# Patient Record
Sex: MALE | Employment: PART TIME | URBAN - METROPOLITAN AREA
[De-identification: names, ages, dates, MRNs, and addresses within clinical notes are randomized per-mention and may not be internally consistent; named-entity substitution may affect disease eponyms.]

---

## 2019-01-17 ENCOUNTER — OFFICE VISIT (OUTPATIENT)
Dept: PEDIATRIC GASTROENTEROLOGY | Age: 19
End: 2019-01-17

## 2019-01-17 ENCOUNTER — HOSPITAL ENCOUNTER (OUTPATIENT)
Dept: GENERAL RADIOLOGY | Age: 19
Discharge: HOME | End: 2019-01-17
Payer: COMMERCIAL

## 2019-01-17 VITALS
HEIGHT: 71 IN | OXYGEN SATURATION: 98 % | BODY MASS INDEX: 17.5 KG/M2 | TEMPERATURE: 98.3 F | DIASTOLIC BLOOD PRESSURE: 92 MMHG | HEART RATE: 69 BPM | SYSTOLIC BLOOD PRESSURE: 142 MMHG | WEIGHT: 125 LBS | RESPIRATION RATE: 18 BRPM

## 2019-01-17 DIAGNOSIS — R10.84 GENERALIZED ABDOMINAL PAIN: Primary | ICD-10-CM

## 2019-01-17 DIAGNOSIS — R11.2 NON-INTRACTABLE VOMITING WITH NAUSEA, UNSPECIFIED VOMITING TYPE: ICD-10-CM

## 2019-01-17 DIAGNOSIS — R62.7 POOR WEIGHT GAIN IN ADULT: ICD-10-CM

## 2019-01-17 DIAGNOSIS — R10.84 GENERALIZED ABDOMINAL PAIN: ICD-10-CM

## 2019-01-17 PROCEDURE — 74018 RADEX ABDOMEN 1 VIEW: CPT

## 2019-01-17 RX ORDER — PANTOPRAZOLE SODIUM 20 MG/1
20 TABLET, DELAYED RELEASE ORAL DAILY
Qty: 30 TAB | Refills: 2 | Status: SHIPPED | OUTPATIENT
Start: 2019-01-17 | End: 2021-09-01 | Stop reason: SDUPTHER

## 2019-01-17 NOTE — H&P (VIEW-ONLY)
1/17/2019 Hipolito Gracia. 
2000 CC: Abdominal Pain History of present illness Hipolito Tierney was seen today as a new patient for abdominal pain. The pain started 4 years ago. There was no preceding illness or trauma. The pain has been localized to the generalized region. The pain is described as being aching, cramping and colicky and lasting 4 hours without radiation. The pain is occurring every 1 day. Worse post meals. With associated nausea and NBNB emesis. There are no reports of some pharyngeal esophageal dysphagia. Stool are reported to be loose/hard occurring every 1 day, without blood or edith-anal pain. There are no reports of abnormal urination. There are no reports of chronic fevers. There are no reports of rashes or joint pain. He was in [place] and underwent a colonoscopy. One small polyp was noted. He was labeled with having irritable bowel syndrome in [place]. He struggled to gain weight and currently weighs 125 pounds. He is smoking marijuana regularly to self medicate for the nausea. This does not seem to be helping. He is also not achieving improvement with acid suppression medication Allergies: No known drug allergies No current outpatient medications on file prior to visit. No current facility-administered medications on file prior to visit. Family History Problem Relation Age of Onset  Stroke Mother   
     cva  Depression Mother  GERD Mother  Other Mother   
     gastro peresis, hypothyroidism, benign uterian cysts and polyps  Gall Bladder Disease Mother   
     removed  Other Father   
     necrotic hip tissue, two hip replacements Past Surgical History:  
Procedure Laterality Date  HX OTHER SURGICAL Fixed Circumcision  HX RHINOPLASTY  HX TONSIL AND ADENOIDECTOMY Immunizations are up to date by report. Review of Systems General: No fevers, poor weight gain Hematologic: denies bruising, excessive bleeding Head/Neck: denies vision changes, sore throat, runny nose, nose bleeds, or hearing changes Respiratory: denies cough, shortness of breath, wheezing, stridor, or cough Cardiovascular: denies chest pain, hypertension, palpitations, syncope, dyspnea on exertion Gastrointestinal: Positive nausea positive pain Genitourinary: denies dysuria, frequency, urgency, or enuresis or daytime wetting Musculoskeletal: denies pain, swelling, redness of muscles or joints Neurologic: denies convulsions, paralyses, or tremor Dermatologic: denies rash, itching, or dryness Psychiatric/Behavior: denies emotional problems, anxiety, depression, or previous psychiatric care Lymphatic: denies local or general lymph node enlargement or tenderness Endocrine: denies polydipsia, polyuria, intolerance to heat or cold, or abnormal sexual development. Physical Exam 
 height is 5' 10.63\" (1.794 m) and weight is 125 lb (56.7 kg). His oral temperature is 98.3 °F (36.8 °C). His blood pressure is 145/91 (abnormal) and his pulse is 69. His respiration is 18 and oxygen saturation is 98%. Body mass index is 17.62 kg/m². General: He is awake, alert, and in no distress, and appears to be a bit thin. He is hydrated HEENT: The sclera appear anicteric, the conjunctiva pink, the oral mucosa appears without lesions, and the dentition is fair. Chest: Clear breath sounds CV: Regular rate and rhythm Abdomen: soft, mild periumbilical tenderness without guarding, non-distended, without masses. There is no hepatosplenomegaly, bowel sounds active Extremities: well perfused with no joint abnormalities Skin: no rash, no jaundice Neuro: moves all 4 well, normal gait Lymph: no significant lymphadenopathy Impression Impression Raul Dacosta is 19 y.o.  with abdominal pain, nausea, poor weight gain.   He underwent a colonoscopy in [place] with polyp which is likely noncontributory. Is been labeled with irritable bowel syndrome given his persistence of nausea and reports of some dysphasia as well it seems like he needs additional evaluation, as he has current red flag signs and symptoms Plan/Recommendation Stop smoking marijuana, may be causing hyperemesis syndrome Protonix 20 mg daily KUB today Labs: CBC, CMP, Lipase, celiac panel EGD pending KUB and lab results Following up with primary care provider on thyroid issue, these tests were run last month All patient and caregiver questions and concerns were addressed during the visit. Major risks, benefits, and side-effects of therapy were discussed.

## 2019-01-17 NOTE — PROGRESS NOTES
Chief Complaint Patient presents with  Feeding Concern Can not keep food down  Abdominal Pain Patient complains of abdominal upset after eating. Patient had coloscopy last year with hx polyps that was benign. C/O pain in left side of abdomen, rates pain 4/10 on pain scale. Initial blood pressure was high at 145/91, Manual bp checked at end of office visit, 142/92. Dr. Gema Martin informed. Per Dr. Gema Martin patient advised to f/u with primary care.

## 2019-01-17 NOTE — PROGRESS NOTES
1/17/2019 Himanshu Montana 
2000 CC: Abdominal Pain History of present illness Himanshu Montana was seen today as a new patient for abdominal pain. The pain started 4 years ago. There was no preceding illness or trauma. The pain has been localized to the generalized region. The pain is described as being aching, cramping and colicky and lasting 4 hours without radiation. The pain is occurring every 1 day. Worse post meals. With associated nausea and NBNB emesis. There are no reports of some pharyngeal esophageal dysphagia. Stool are reported to be loose/hard occurring every 1 day, without blood or edith-anal pain. There are no reports of abnormal urination. There are no reports of chronic fevers. There are no reports of rashes or joint pain. He was in [place] and underwent a colonoscopy. One small polyp was noted. He was labeled with having irritable bowel syndrome in [place]. He struggled to gain weight and currently weighs 125 pounds. He is smoking marijuana regularly to self medicate for the nausea. This does not seem to be helping. He is also not achieving improvement with acid suppression medication Allergies: No known drug allergies No current outpatient medications on file prior to visit. No current facility-administered medications on file prior to visit. Family History Problem Relation Age of Onset  Stroke Mother   
     cva  Depression Mother  GERD Mother  Other Mother   
     gastro peresis, hypothyroidism, benign uterian cysts and polyps  Gall Bladder Disease Mother   
     removed  Other Father   
     necrotic hip tissue, two hip replacements Past Surgical History:  
Procedure Laterality Date  HX OTHER SURGICAL Fixed Circumcision  HX RHINOPLASTY  HX TONSIL AND ADENOIDECTOMY Immunizations are up to date by report. Review of Systems General: No fevers, poor weight gain Hematologic: denies bruising, excessive bleeding Head/Neck: denies vision changes, sore throat, runny nose, nose bleeds, or hearing changes Respiratory: denies cough, shortness of breath, wheezing, stridor, or cough Cardiovascular: denies chest pain, hypertension, palpitations, syncope, dyspnea on exertion Gastrointestinal: Positive nausea positive pain Genitourinary: denies dysuria, frequency, urgency, or enuresis or daytime wetting Musculoskeletal: denies pain, swelling, redness of muscles or joints Neurologic: denies convulsions, paralyses, or tremor Dermatologic: denies rash, itching, or dryness Psychiatric/Behavior: denies emotional problems, anxiety, depression, or previous psychiatric care Lymphatic: denies local or general lymph node enlargement or tenderness Endocrine: denies polydipsia, polyuria, intolerance to heat or cold, or abnormal sexual development. Physical Exam 
 height is 5' 10.63\" (1.794 m) and weight is 125 lb (56.7 kg). His oral temperature is 98.3 °F (36.8 °C). His blood pressure is 145/91 (abnormal) and his pulse is 69. His respiration is 18 and oxygen saturation is 98%. Body mass index is 17.62 kg/m². General: He is awake, alert, and in no distress, and appears to be a bit thin. He is hydrated HEENT: The sclera appear anicteric, the conjunctiva pink, the oral mucosa appears without lesions, and the dentition is fair. Chest: Clear breath sounds CV: Regular rate and rhythm Abdomen: soft, mild periumbilical tenderness without guarding, non-distended, without masses. There is no hepatosplenomegaly, bowel sounds active Extremities: well perfused with no joint abnormalities Skin: no rash, no jaundice Neuro: moves all 4 well, normal gait Lymph: no significant lymphadenopathy Impression Impression Jasmine Hays is 19 y.o.  with abdominal pain, nausea, poor weight gain.   He underwent a colonoscopy in [place] with polyp which is likely noncontributory. Is been labeled with irritable bowel syndrome given his persistence of nausea and reports of some dysphasia as well it seems like he needs additional evaluation, as he has current red flag signs and symptoms Plan/Recommendation Stop smoking marijuana, may be causing hyperemesis syndrome Protonix 20 mg daily KUB today Labs: CBC, CMP, Lipase, celiac panel EGD pending KUB and lab results Following up with primary care provider on thyroid issue, these tests were run last month All patient and caregiver questions and concerns were addressed during the visit. Major risks, benefits, and side-effects of therapy were discussed.

## 2019-01-17 NOTE — PATIENT INSTRUCTIONS
Stop mariajuana use - could be causing hyperemesis Labs today KUB today EGD in a few weeks 
 
protonix 20 mg daily

## 2019-01-18 LAB
ALBUMIN SERPL-MCNC: 5.1 G/DL (ref 3.5–5.5)
ALBUMIN/GLOB SERPL: 2.2 {RATIO} (ref 1.2–2.2)
ALP SERPL-CCNC: 67 IU/L (ref 39–117)
ALT SERPL-CCNC: 17 IU/L (ref 0–44)
AST SERPL-CCNC: 18 IU/L (ref 0–40)
BASOPHILS # BLD AUTO: 0.1 X10E3/UL (ref 0–0.2)
BASOPHILS NFR BLD AUTO: 1 %
BILIRUB SERPL-MCNC: 0.7 MG/DL (ref 0–1.2)
BUN SERPL-MCNC: 8 MG/DL (ref 6–20)
BUN/CREAT SERPL: 11 (ref 9–20)
CALCIUM SERPL-MCNC: 10.1 MG/DL (ref 8.7–10.2)
CHLORIDE SERPL-SCNC: 105 MMOL/L (ref 96–106)
CO2 SERPL-SCNC: 22 MMOL/L (ref 20–29)
CREAT SERPL-MCNC: 0.72 MG/DL (ref 0.76–1.27)
EOSINOPHIL # BLD AUTO: 0 X10E3/UL (ref 0–0.4)
EOSINOPHIL NFR BLD AUTO: 1 %
ERYTHROCYTE [DISTWIDTH] IN BLOOD BY AUTOMATED COUNT: 13.5 % (ref 12.3–15.4)
GLIADIN PEPTIDE IGA SER-ACNC: 4 UNITS (ref 0–19)
GLIADIN PEPTIDE IGG SER-ACNC: 3 UNITS (ref 0–19)
GLOBULIN SER CALC-MCNC: 2.3 G/DL (ref 1.5–4.5)
GLUCOSE SERPL-MCNC: 91 MG/DL (ref 65–99)
HCT VFR BLD AUTO: 43.1 % (ref 37.5–51)
HGB BLD-MCNC: 14.1 G/DL (ref 13–17.7)
IGA SERPL-MCNC: 143 MG/DL (ref 90–386)
IMM GRANULOCYTES # BLD AUTO: 0 X10E3/UL (ref 0–0.1)
IMM GRANULOCYTES NFR BLD AUTO: 0 %
LIPASE SERPL-CCNC: 23 U/L (ref 13–78)
LYMPHOCYTES # BLD AUTO: 2.1 X10E3/UL (ref 0.7–3.1)
LYMPHOCYTES NFR BLD AUTO: 35 %
MCH RBC QN AUTO: 28.1 PG (ref 26.6–33)
MCHC RBC AUTO-ENTMCNC: 32.7 G/DL (ref 31.5–35.7)
MCV RBC AUTO: 86 FL (ref 79–97)
MONOCYTES # BLD AUTO: 0.5 X10E3/UL (ref 0.1–0.9)
MONOCYTES NFR BLD AUTO: 8 %
NEUTROPHILS # BLD AUTO: 3.3 X10E3/UL (ref 1.4–7)
NEUTROPHILS NFR BLD AUTO: 55 %
PLATELET # BLD AUTO: 206 X10E3/UL (ref 150–379)
POTASSIUM SERPL-SCNC: 4.1 MMOL/L (ref 3.5–5.2)
PROT SERPL-MCNC: 7.4 G/DL (ref 6–8.5)
RBC # BLD AUTO: 5.02 X10E6/UL (ref 4.14–5.8)
SODIUM SERPL-SCNC: 145 MMOL/L (ref 134–144)
TTG IGA SER-ACNC: <2 U/ML (ref 0–3)
TTG IGG SER-ACNC: <2 U/ML (ref 0–5)
WBC # BLD AUTO: 6 X10E3/UL (ref 3.4–10.8)

## 2019-01-21 NOTE — PROGRESS NOTES
Called Baldemar Oliver and informed him of results and plan. He verbalized understanding and had no further questions.   
 
Pts cell:[phone]

## 2019-02-01 ENCOUNTER — ANESTHESIA (OUTPATIENT)
Dept: ENDOSCOPY | Age: 19
End: 2019-02-01
Payer: COMMERCIAL

## 2019-02-01 ENCOUNTER — ANESTHESIA EVENT (OUTPATIENT)
Dept: ENDOSCOPY | Age: 19
End: 2019-02-01
Payer: COMMERCIAL

## 2019-02-01 ENCOUNTER — HOSPITAL ENCOUNTER (OUTPATIENT)
Age: 19
Setting detail: OUTPATIENT SURGERY
Discharge: HOME | End: 2019-02-01
Attending: PEDIATRICS | Admitting: PEDIATRICS
Payer: COMMERCIAL

## 2019-02-01 VITALS
HEART RATE: 57 BPM | OXYGEN SATURATION: 100 % | TEMPERATURE: 97.9 F | BODY MASS INDEX: 18.61 KG/M2 | WEIGHT: 130 LBS | SYSTOLIC BLOOD PRESSURE: 128 MMHG | HEIGHT: 70 IN | RESPIRATION RATE: 18 BRPM | DIASTOLIC BLOOD PRESSURE: 76 MMHG

## 2019-02-01 DIAGNOSIS — R10.13 EPIGASTRIC PAIN: ICD-10-CM

## 2019-02-01 PROCEDURE — 88305 TISSUE EXAM BY PATHOLOGIST: CPT

## 2019-02-01 PROCEDURE — 77030009426 HC FCPS BIOP ENDOSC BSC -B: Performed by: PEDIATRICS

## 2019-02-01 PROCEDURE — 76060000031 HC ANESTHESIA FIRST 0.5 HR: Performed by: PEDIATRICS

## 2019-02-01 PROCEDURE — 76040000019: Performed by: PEDIATRICS

## 2019-02-01 PROCEDURE — 74011250636 HC RX REV CODE- 250/636

## 2019-02-01 RX ORDER — PROPOFOL 10 MG/ML
INJECTION, EMULSION INTRAVENOUS AS NEEDED
Status: DISCONTINUED | OUTPATIENT
Start: 2019-02-01 | End: 2019-02-01 | Stop reason: HOSPADM

## 2019-02-01 RX ORDER — LIDOCAINE HYDROCHLORIDE 20 MG/ML
INJECTION, SOLUTION EPIDURAL; INFILTRATION; INTRACAUDAL; PERINEURAL AS NEEDED
Status: DISCONTINUED | OUTPATIENT
Start: 2019-02-01 | End: 2019-02-01 | Stop reason: HOSPADM

## 2019-02-01 RX ORDER — SODIUM CHLORIDE 9 MG/ML
INJECTION, SOLUTION INTRAVENOUS
Status: DISCONTINUED | OUTPATIENT
Start: 2019-02-01 | End: 2019-02-01 | Stop reason: HOSPADM

## 2019-02-01 RX ADMIN — PROPOFOL 100 MG: 10 INJECTION, EMULSION INTRAVENOUS at 15:40

## 2019-02-01 RX ADMIN — PROPOFOL 100 MG: 10 INJECTION, EMULSION INTRAVENOUS at 15:41

## 2019-02-01 RX ADMIN — LIDOCAINE HYDROCHLORIDE 100 MG: 20 INJECTION, SOLUTION EPIDURAL; INFILTRATION; INTRACAUDAL; PERINEURAL at 15:39

## 2019-02-01 RX ADMIN — PROPOFOL 100 MG: 10 INJECTION, EMULSION INTRAVENOUS at 15:39

## 2019-02-01 RX ADMIN — SODIUM CHLORIDE: 9 INJECTION, SOLUTION INTRAVENOUS at 15:38

## 2019-02-01 NOTE — INTERVAL H&P NOTE
H&P Update: 
Malathi Spangler was seen and examined. History and physical has been reviewed. The patient has been examined. There have been no significant clinical changes since the completion of the originally dated History and Physical. 
Patient identified by surgeon; surgical site was confirmed by patient and surgeon.  
 
Signed By: Flakita Lal MD   
 February 1, 2019 2:22 PM

## 2019-02-01 NOTE — DISCHARGE INSTRUCTIONS
118 [facility].  217 [facility]  [address]  [id number]  2000    EGD DISCHARGE INSTRUCTIONS  Discomfort:  Sore throat- throat lozenges or warm salt water gargle  redness at IV site- apply warm compress to area; if redness or soreness persist- contact your physician  Gaseous discomfort- walking, belching will help relieve any discomfort  You may not operate a vehicle for 12 hours    DIET Regular diet. MEDICATIONS:  Resume home medications    ACTIVITY   Spend the remainder of the day resting -  avoid any strenuous activity. May resume normal activities tomorrow. CALL M.D. ANY SIGN of:  Increasing pain, nausea, vomiting  Abdominal distension (swelling)  Fever or chills  Pain in chest area      Follow-up Instructions:  Call [facility] for any questions or problems.  Telephone # [phone]

## 2019-02-01 NOTE — ROUTINE PROCESS
Malathi Goldy. 
2000 
[id number] Situation: 
Verbal report received from: Janell Nolan 272 Procedure: Procedure(s): ESOPHAGOGASTRODUODENOSCOPY (EGD) ESOPHAGOGASTRODUODENAL (EGD) BIOPSY Background: 
 
Preoperative diagnosis: ABDOMINAL PAIN Postoperative diagnosis: Abdominal Pain :  Dr. Jeff Garcia Assistant(s): Endoscopy RN-1: Rose Marie Fan RN Endoscopy RN-2: Mare Mathew RN Specimens:  
ID Type Source Tests Collected by Time Destination 1 : Duodenum bx Preservative   Mikael Matias MD 2/1/2019 1434 Pathology 2 : Stomach bx Preservative   Mikael Matias MD 2/1/2019 1435 Pathology 3 : Distal Esophagus bx Preservative   Mikael Matias MD 2/1/2019 1436 Pathology 4 : Mid Esophagus bx Ralph Matias MD 2/1/2019 1436 Pathology H. Pylori  no Assessment: 
Intra-procedure medications Anesthesia gave intra-procedure sedation and medications, see anesthesia flow sheet yes Intravenous fluids: NS@ Esta Shyam Vital signs stable Abdominal assessment: round and soft Recommendation: 
Discharge patient per MD order. Family or Friend Andreas and his mom Permission to share finding with family or friend yes

## 2019-02-01 NOTE — ANESTHESIA POSTPROCEDURE EVALUATION
Procedure(s): ESOPHAGOGASTRODUODENOSCOPY (EGD) ESOPHAGOGASTRODUODENAL (EGD) BIOPSY. Anesthesia Post Evaluation Patient location during evaluation: PACU Patient participation: complete - patient participated Level of consciousness: awake and alert Pain management: adequate Airway patency: patent Anesthetic complications: no 
Cardiovascular status: acceptable Respiratory status: acceptable Hydration status: acceptable Comments: I have seen and evaluated the patient and is ready for discharge. Layla Holden MD 
 
Post anesthesia nausea and vomiting:  none Visit Vitals /56 Pulse 76 Temp 36.9 °C (98.5 °F) Resp 20 Ht 5' 10\" (1.778 m) Wt 59 kg (130 lb) SpO2 95% BMI 18.65 kg/m²

## 2019-02-01 NOTE — OP NOTES
118 [facility]  [address], [address]   [phone]      Endoscopic Esophagogastroduodenoscopy Procedure Note    Karel Alegria  2000  [id number]    Procedure: Endoscopic Gastroduodenoscopy with biopsy    Pre-operative Diagnosis: epigastric pain    Post-operative Diagnosis: normal EGD grossly    : John Christian MD    Referring Provider:  Phong Moyer MD    Anesthesia/Sedation: Sedation provided by the Anesthesia team.     Pre-Procedural Exam:  Heart: RRR, without gallops or rubs  Lungs: clear bilaterally without wheezes, crackles, or rhonchi  Abdomen: soft, nontender, nondistended, bowel sounds present  Mental Status: awake, alert      Procedure Details   After satisfactory titration of sedation, an endoscope was inserted through the oropharynx into the upper esophagus. The endoscope was then passed through the lower esophagus and then the GE junction, and then into the stomach to the level of the pylorus and then retroflexed and the gastroesophageal junction was inspected. Endoscope was advanced through the pylorus into the second to third portion of the duodenum and then retracted back into the gastric lumen. The stomach was decompressed and the endoscope was retracted into the distal esophagus. The endoscope was retracted to the mid and upper esophagus. The stomach was decompressed and the endoscope was retracted fully. Findings:   Esophagus:normal  Stomach:normal   Duodenum/jejunum:normal    Therapies:  none    Specimens:   · Antrum - 2  · Duodenum - 2  · Duodenal bulb - 2  · Distal esophagus - 2  · Mid esophagus - 2           Estimated Blood Loss:  minimal    Complications:   None; patient tolerated the procedure well. Impression:    -Normal upper endoscopy, with no endoscopic evidence or mucosal abnormality. No ulcers  Recommendations:  -Acid suppression with a proton pump inhibitor. , -Await pathology. , -Follow up with me.     Bihn Agarwal Nas Zacarias MD

## 2019-02-01 NOTE — PROGRESS NOTES

## 2019-02-01 NOTE — ANESTHESIA PREPROCEDURE EVALUATION
Anesthetic History No history of anesthetic complications Review of Systems / Medical History Patient summary reviewed, nursing notes reviewed and pertinent labs reviewed Pulmonary Within defined limits Neuro/Psych Within defined limits Cardiovascular Within defined limits GI/Hepatic/Renal 
Within defined limits Endo/Other Within defined limits Other Findings Physical Exam 
 
Airway Mallampati: II 
TM Distance: > 6 cm Neck ROM: normal range of motion Mouth opening: Normal 
 
 Cardiovascular Regular rate and rhythm,  S1 and S2 normal,  no murmur, click, rub, or gallop Dental 
No notable dental hx Pulmonary Breath sounds clear to auscultation Abdominal 
GI exam deferred Other Findings Anesthetic Plan ASA: 1 Anesthesia type: MAC Anesthetic plan and risks discussed with: Patient

## 2021-09-01 ENCOUNTER — OFFICE VISIT (OUTPATIENT)
Dept: FAMILY MEDICINE CLINIC | Age: 21
End: 2021-09-01
Payer: COMMERCIAL

## 2021-09-01 VITALS
SYSTOLIC BLOOD PRESSURE: 138 MMHG | BODY MASS INDEX: 17.44 KG/M2 | HEART RATE: 91 BPM | HEIGHT: 72 IN | WEIGHT: 128.8 LBS | DIASTOLIC BLOOD PRESSURE: 91 MMHG | TEMPERATURE: 97.6 F | OXYGEN SATURATION: 97 % | RESPIRATION RATE: 18 BRPM

## 2021-09-01 DIAGNOSIS — Z76.89 ENCOUNTER TO ESTABLISH CARE: ICD-10-CM

## 2021-09-01 DIAGNOSIS — K58.0 IRRITABLE BOWEL SYNDROME WITH DIARRHEA: Primary | ICD-10-CM

## 2021-09-01 PROCEDURE — 99204 OFFICE O/P NEW MOD 45 MIN: CPT | Performed by: NURSE PRACTITIONER

## 2021-09-01 RX ORDER — PANTOPRAZOLE SODIUM 20 MG/1
20 TABLET, DELAYED RELEASE ORAL DAILY
Qty: 30 TABLET | Refills: 2 | Status: SHIPPED | OUTPATIENT
Start: 2021-09-01

## 2021-09-01 NOTE — PATIENT INSTRUCTIONS
Please take medication as prescribed  Follow up with GI MD for continued stomach issues  Return to have labs checked, will follow up with results when they return. Irritable Bowel Syndrome: Care Instructions  Your Care Instructions  Irritable bowel syndrome, or IBS, is a problem with the intestines that causes belly pain, bloating, gas, constipation, and diarrhea. The cause of IBS is not well known. IBS can last for many years, but it does not get worse over time or lead to serious disease. Most people can control their symptoms by changing their diet and reducing stress. Follow-up care is a key part of your treatment and safety. Be sure to make and go to all appointments, and call your doctor if you are having problems. It's also a good idea to know your test results and keep a list of the medicines you take. How can you care for yourself at home? · Prevent diarrhea:  ? Limit the amount of high-fiber foods you eat. This includes vegetables, fruits, whole-grain breads and pasta, high-fiber cereal, and brown rice. ? Limit dairy products. ? Limit artificial sweeteners such as sorbitol and xylitol. · Avoid constipation:  ? Include fruits, vegetables, beans, and whole grains in your diet each day. These foods are high in fiber. ? Drink plenty of fluids. If you have kidney, heart, or liver disease and have to limit fluids, talk with your doctor before you increase the amount of fluids you drink. ? Get some exercise every day. Build up slowly to 30 to 60 minutes a day on 5 or more days of the week. ? Take a fiber supplement, such as Citrucel or Metamucil, every day if needed. Read and follow all instructions on the label. ? Schedule time each day for a bowel movement. Having a daily routine may help. Take your time and do not strain when having a bowel movement. · To help relieve bloating or gas, avoid foods such as beans, cabbage, cauliflower, or broccoli.   · Keep a daily diary of what you eat and what symptoms you have. This may help find foods that cause you problems. · Eat slowly. Try to make mealtime relaxing. · Find ways to reduce stress. When should you call for help? Call your doctor now or seek immediate medical care if:    · Your pain is different than usual or occurs with fever.     · You lose weight without trying, or you lose your appetite and you do not know why.     · Your symptoms often wake you from sleep.     · Your stools are black and tarlike or have streaks of blood. Watch closely for changes in your health, and be sure to contact your doctor if:    · Your IBS symptoms get worse or begin to disrupt your day-to-day life.     · You become more tired than usual.     · Your home treatment stops working. Where can you learn more? Go to http://www.gray.com/  Enter Y447 in the search box to learn more about \"Irritable Bowel Syndrome: Care Instructions. \"  Current as of: April 15, 2020               Content Version: 12.8  © 2006-2021 [employer]. Care instructions adapted under license by [employer] (which disclaims liability or warranty for this information). If you have questions about a medical condition or this instruction, always ask your healthcare professional. [place] [address] any warranty or liability for your use of this information. Anxiety Disorder: Care Instructions  Your Care Instructions     Anxiety is a normal reaction to stress. Difficult situations can cause you to have symptoms such as sweaty palms and a nervous feeling. In an anxiety disorder, the symptoms are far more severe. Constant worry, muscle tension, trouble sleeping, nausea and diarrhea, and other symptoms can make normal daily activities difficult or impossible. These symptoms may occur for no reason, and they can affect your work, school, or social life. Medicines, counseling, and self-care can all help.   Follow-up care is a key part of your treatment and safety. Be sure to make and go to all appointments, and call your doctor if you are having problems. It's also a good idea to know your test results and keep a list of the medicines you take. How can you care for yourself at home? · Take medicines exactly as directed. Call your doctor if you think you are having a problem with your medicine. · Go to your counseling sessions and follow-up appointments. · Recognize and accept your anxiety. Then, when you are in a situation that makes you anxious, say to yourself, \"This is not an emergency. I feel uncomfortable, but I am not in danger. I can keep going even if I feel anxious. \"  · Be kind to your body:  ? Relieve tension with exercise or a massage. ? Get enough rest.  ? Avoid alcohol, caffeine, nicotine, and illegal drugs. They can increase your anxiety level and cause sleep problems. ? Learn and do relaxation techniques. See below for more about these techniques. · Engage your mind. Get out and do something you enjoy. Go to a [employer] movie, or take a walk or hike. Plan your day. Having too much or too little to do can make you anxious. · Keep a record of your symptoms. Discuss your fears with a good friend or family member, or join a support group for people with similar problems. Talking to others sometimes relieves stress. · Get involved in social groups, or volunteer to help others. Being alone sometimes makes things seem worse than they are. · Get at least 30 minutes of exercise on most days of the week to relieve stress. Walking is a good choice. You also may want to do other activities, such as running, swimming, cycling, or playing tennis or team sports. Relaxation techniques  Do relaxation exercises 10 to 20 minutes a day. You can play soothing, relaxing music while you do them, if you wish. · Tell others in your house that you are going to do your relaxation exercises. Ask them not to disturb you.   · Find a comfortable place, away from all distractions and noise. · Lie down on your back, or sit with your back straight. · Focus on your breathing. Make it slow and steady. · Breathe in through your nose. Breathe out through either your nose or mouth. · Breathe deeply, filling up the area between your navel and your rib cage. Breathe so that your belly goes up and down. · Do not hold your breath. · Breathe like this for 5 to 10 minutes. Notice the feeling of calmness throughout your whole body. As you continue to breathe slowly and deeply, relax by doing the following for another 5 to 10 minutes:  · Tighten and relax each muscle group in your body. You can begin at your toes and work your way up to your head. · Imagine your muscle groups relaxing and becoming heavy. · Empty your mind of all thoughts. · Let yourself relax more and more deeply. · Become aware of the state of calmness that surrounds you. · When your relaxation time is over, you can bring yourself back to alertness by moving your fingers and toes and then your hands and feet and then stretching and moving your entire body. Sometimes people fall asleep during relaxation, but they usually wake up shortly afterward. · Always give yourself time to return to full alertness before you drive a car or do anything that might cause an accident if you are not fully alert. Never play a relaxation tape while you drive a car. When should you call for help? Call 911 anytime you think you may need emergency care. For example, call if:    · You feel you cannot stop from hurting yourself or someone else. Keep the numbers for these national suicide hotlines: 7-664-943-TALK (4-137.894.5072) and 7-131-SUUYAWN (5-643.954.2717). If you or someone you know talks about suicide or feeling hopeless, get help right away.   Watch closely for changes in your health, and be sure to contact your doctor if:    · You have anxiety or fear that affects your life.     · You have symptoms of anxiety that are new or different from those you had before. Where can you learn more? Go to http://www.mana.bo.com/  Enter P754 in the search box to learn more about \"Anxiety Disorder: Care Instructions. \"  Current as of: September 23, 2020               Content Version: 12.8  © 6432-6445 [employer], [employer]. Care instructions adapted under license by [employer] (which disclaims liability or warranty for this information). If you have questions about a medical condition or this instruction, always ask your healthcare professional. [address] any warranty or liability for your use of this information.

## 2021-09-01 NOTE — PROGRESS NOTES
Serg Garzon is a 19 y.o. male who presents to clinic today for the following:    Chief Complaint   Patient presents with   [address] Patient       Vitals:    09/01/21 1218   BP: (!) 138/91   Pulse: 91   Resp: 18   Temp: 97.6 °F (36.4 °C)   TempSrc: Temporal   SpO2: 97%   Weight: 128 lb 12.8 oz (58.4 kg)   Height: 5' 11.5\" (1.816 m)       Body mass index is 17.71 kg/m². Patients past medical, surgical and family histories were reviewed. Allergies and Medications reviewed and updated. Current Outpatient Medications:     pantoprazole (PROTONIX) 20 mg tablet, Take 1 Tablet by mouth daily. , Disp: 30 Tablet, Rfl: 2    No Known Allergies    Past Medical History:   Diagnosis Date    Thyroid activity decreased     flutuating thyroid levels       Past Surgical History:   Procedure Laterality Date    HX OTHER SURGICAL      Fixed Circumcision    HX RHINOPLASTY      HX TONSIL AND ADENOIDECTOMY         Family History   Problem Relation Age of Onset    Stroke Mother         cva     Depression Mother     GERD Mother     Other Mother         gastro peresis, hypothyroidism, benign uterian cysts and polyps      Gall Bladder Disease Mother         removed     Other Father         necrotic hip tissue, two hip replacements        Social History     Socioeconomic History    Marital status: SINGLE     Spouse name: Not on file    Number of children: Not on file    Years of education: Not on file    Highest education level: Not on file   Occupational History    Not on file   Tobacco Use    Smoking status: Never Smoker    Smokeless tobacco: Never Used   Substance and Sexual Activity    Alcohol use: Yes     Comment: occ    Drug use: Not Currently     Types: Marijuana     Comment: Use for medicinal use, currently trying to quit     Sexual activity: Yes   Other Topics Concern    Not on file   Social History Narrative    Not on file     Social Determinants of Health     Financial Resource Strain:     Difficulty of Paying Living Expenses:    Food Insecurity:     Worried About Running Out of Food in the Last Year:     [address] in the Last Year:    Transportation Needs:     Lack of Transportation (Medical):  Lack of Transportation (Non-Medical):    Physical Activity:     Days of Exercise per Week:     Minutes of Exercise per Session:    Stress:     Feeling of Stress :    Social Connections:     Frequency of Communication with Friends and Family:     Frequency of Social Gatherings with Friends and Family:     Attends Christianity Services:     Active Member of Clubs or Organizations:     Attends Club or Organization Meetings:     Marital Status:    Intimate Partner Violence:     Fear of Current or Ex-Partner:     Emotionally Abused:     Physically Abused:     Sexually Abused:            Physical Exam  Vitals and nursing note reviewed. Constitutional:       Appearance: He is normal weight. HENT:      Head: Normocephalic. Nose: Nose normal.      Mouth/Throat:      Mouth: Mucous membranes are moist.   Eyes:      Pupils: Pupils are equal, round, and reactive to light. Cardiovascular:      Rate and Rhythm: Normal rate. Pulses: Normal pulses. Heart sounds: Normal heart sounds. Pulmonary:      Effort: Pulmonary effort is normal.      Breath sounds: Normal breath sounds. Abdominal:      General: Abdomen is flat. Musculoskeletal:         General: Normal range of motion. Cervical back: Normal range of motion. Skin:     General: Skin is warm. Capillary Refill: Capillary refill takes less than 2 seconds. Neurological:      General: No focal deficit present. Mental Status: He is alert and oriented to person, place, and time. Psychiatric:         Mood and Affect: Mood normal.         Behavior: Behavior normal.          Patient was seen as a new patient to establish care. Patient reports moving to [facility] from [place].   When [place] he has seen a GI doctor for irritable bowel syndrome. He had been placed on Protonix. Patient reports that he lost his insurance and was unable to afford the medication. He reports now that he has a job he would like to go back on that medication as it was helpful to him. This medication has been refilled. He denies any blood in his stool. He does report occasional abdominal discomfort. I have referred the patient to GI to further establish care in the [facility] area. Patient also sees a psychiatrist.  Patient does have anxiety disorder. This was all reported by the patient. Patient is requesting an accommodation for work to better work remotely at home because of fear of COVID-19. Patient reports being immunized and has very low risk factors. He then stated his primary reason was because of his anxiety about being around other people. I have declined any accommodation at this time and referred him to his psychiatrist to get their opinion and evaluation. I have ordered routine labs to further establish care and address concerns of the patient will follow-up when results return        Review of Systems   Constitutional: Negative for fever and malaise/fatigue. HENT: Negative for congestion, sinus pain and sore throat. Eyes: Negative. Respiratory: Negative for cough and shortness of breath. Cardiovascular: Negative for chest pain. Gastrointestinal: Negative for diarrhea, nausea and vomiting. Genitourinary: Negative for dysuria. Musculoskeletal: Negative. Skin: Negative. Neurological: Negative for dizziness and headaches. Endo/Heme/Allergies: Negative for environmental allergies. Psychiatric/Behavioral: The patient is nervous/anxious. No results found. No results found for this or any previous visit (from the past 24 hour(s)). Assessment and Plan:    Encounter Diagnoses   Name Primary?     Irritable bowel syndrome with diarrhea Yes    Encounter to establish care                 I have discussed the diagnosis with the patient and the intended plan as seen in the above orders. he has expressed understanding. The patient has received an after-visit summary and questions were answered concerning future plans. I have discussed medication side effects and warnings with the patient as well.         Electronically Signed: Nevaeh Scherer NP
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
## 2022-03-19 PROBLEM — R10.13 EPIGASTRIC PAIN: Status: ACTIVE | Noted: 2019-02-01

## 2023-05-11 RX ORDER — PANTOPRAZOLE SODIUM 20 MG/1
1 TABLET, DELAYED RELEASE ORAL DAILY
COMMUNITY
Start: 2021-09-01

## (undated) DEVICE — Device: Brand: MEDICAL ACTION INDUSTRIES

## (undated) DEVICE — CANN NASAL O2 CAPNOGRAPHY AD -- FILTERLINE

## (undated) DEVICE — FORCEPS BX L240CM JAW DIA2.8MM L CAP W/ NDL MIC MESH TOOTH

## (undated) DEVICE — BAG BELONG PT PERS CLEAR HANDL

## (undated) DEVICE — SYRINGE MED 20ML STD CLR PLAS LUERLOCK TIP N CTRL DISP

## (undated) DEVICE — ENDO CARRY-ON PROCEDURE KIT INCLUDES ENZYMATIC SPONGE, GAUZE, BIOHAZARD LABEL, TRAY, LUBRICANT, DIRTY SCOPE LABEL, WATER LABEL, TRAY, DRAWSTRING PAD, AND DEFENDO 4-PIECE KIT.: Brand: ENDO CARRY-ON PROCEDURE KIT

## (undated) DEVICE — KENDALL RADIOLUCENT FOAM MONITORING ELECTRODE -RECTANGULAR SHAPE: Brand: KENDALL

## (undated) DEVICE — CATH IV AUTOGRD BC BLU 22GA 25 -- INSYTE

## (undated) DEVICE — BW-412T DISP COMBO CLEANING BRUSH: Brand: SINGLE USE COMBINATION CLEANING BRUSH

## (undated) DEVICE — CONTAINER SPEC 20 ML LID NEUT BUFF FORMALIN 10 % POLYPR STS

## (undated) DEVICE — SOLIDIFIER FLUID 3000 CC ABSORB

## (undated) DEVICE — NEEDLE HYPO 18GA L1.5IN PNK S STL HUB POLYPR SHLD REG BVL

## (undated) DEVICE — Z DISCONTINUED NO SUB IDED SET EXTN W/ 4 W STPCOCK M SPIN LOK 36IN

## (undated) DEVICE — 1200 GUARD II KIT W/5MM TUBE W/O VAC TUBE: Brand: GUARDIAN

## (undated) DEVICE — BAG SPEC BIOHZD LF 2MIL 6X10IN -- CONVERT TO ITEM 357326

## (undated) DEVICE — SET ADMIN 16ML TBNG L100IN 2 Y INJ SITE IV PIGGY BK DISP